# Patient Record
(demographics unavailable — no encounter records)

---

## 2024-11-01 NOTE — PLAN
[FreeTextEntry1] : #Well person exam -Medical/surgical/family history reviewed -Allergies and medications reconciled -Pap performed -Reviewed breast awareness/calcium/vitamin D/weight bearing exercise   #Health Care Maintenance -Mammogram + breast sono rx given- due 6/2025 -Colonoscopy due, pt states she has to schedule, previously did cologuard this year which was negative -Dexa to be scheduled with Dr. Chaudhry  #vaginal dryness -discussed lubricants -gave pamphlets for revaree & hyaloyn   All questions/concerns of pt addressed to their satisfaction.   F/u in 1 yr or sooner PRN.

## 2024-11-01 NOTE — HISTORY OF PRESENT ILLNESS
[FreeTextEntry1] : Pt is a 68 year  postmenopausal female presenting today for annual exam.   Pt without any concerns at this time. Experiences intermittent vaginal dryness but it is not bothersome to her at this time. Pt w/ h/o osteoporosis--follows w/ Dr. Chaudhry; was previously on medication but did not tolerate it. Is seeing him next week for follow up appt. Denies breast pain, abnormal nipple discharge, abdominal pain, abnormal uterine bleeding, vaginal discharge/irritation, dysuria, dyspareunia. Pt is not sexually active.  Pt reports regular exercise. Pt reports balanced diet.  PHQ9: 0   OB: NSVDx2  Gyn: denies abnormal pap smears, fibroids, endometriosis, ovarian cysts, STIs PMH: osteoporosis, graves disease--now hypothyroid PSH: denies FMHx: denies family h/o breast, ovarian, uterine, or colon cancer Meds: synthroid 50mcg Allx: NKDA Social: denies alcohol use, denies tobacco use, denies drug use [Mammogramdate] : 06/24 [BreastSonogramDate] : 06/24 [PapSmeardate] : 2022 [BoneDensityDate] : 2023 [ColonoscopyDate] : 2017

## 2024-11-01 NOTE — PHYSICAL EXAM
[Appropriately responsive] : appropriately responsive [Alert] : alert [No Acute Distress] : no acute distress [Soft] : soft [Non-tender] : non-tender [Non-distended] : non-distended [No Lesions] : no lesions [No Mass] : no mass [Oriented x3] : oriented x3 [Examination Of The Breasts] : a normal appearance [Diffuse Fibrous Tissue In The Left Breast] : fibrocystic changes [No Masses] : no breast masses were palpable [Labia Majora] : normal [Atrophy] : atrophy [Normal] : normal [Tenderness] : nontender [Uterine Adnexae] : normal [No Tenderness] : no tenderness [Nl Sphincter Tone] : normal sphincter tone [FreeTextEntry9] : guaiac negative

## 2024-11-05 NOTE — ASSESSMENT
[FreeTextEntry1] : 66-year-old woman with history of hypothyroidism status post PRADHAN for Graves' disease, osteoporosis, prediabetes, here for endocrinology follow-up. Last seen Nov 2021.  1. Hypothyroidism Check TFT today Clinically and biochemically euthyroid Adjust dosage of levothyroxine as needed.  2. Prediabetes. A1c 5.6% July 2024 A1c 5.9% June 2023 Exercise: Discussed the regular exercises are beneficial in type 2 diabetes, independent of weight loss. Encouraged to decrease sedentary time and perform 30 to 60 minutes of moderate intensity aerobic exercise on most days of the week, at least 150 minutes of moderate intensity aerobic exercise per week. Recommend Carb. consistent diet Check A1c level today.  3. Osteoporosis Was treated with alendronate since 2019.  Discontinued in 2022. Bone density as noted below. L1-L4 11/5/2024: Age 68: BMD 0.763, T-score -2.6, -3.2%*, -1.6% 11/14/2023: Age 67: BMD 0.775, T-score -2.5, -1.6%, -1.3% 10/31/2022: Age 66: BMD 0.75, T-score -2.4, -0.2%, -3.7%* 12/4/2020: Age 64: BMD 0.815, T-score -2.1, 3.5%*, 3.5%* 12/6/2019: Age 63: BMD 0.78, T-score -2.4  Total hip 11/5/2024: BMD 0.643, T-score -2.5, -6.7%*, -6.0%* 11/14/2023: BMD 0.684, T-score -2.1, -0.7%, -0.1% 10/31/2022: BMD 0.685, T-score -2.1, -0.6%, 0.7% 12/4/2020: BMD 0.680, T-score -2.1, -1.3%, -1.2% 12/6/2019: BMD 0.69, T-score -2.1  Femoral neck 11/5/2024: BMD 0.541, T-score -2.8, -5.9%*, -4.9% 11/14/2023: BMD 0.570, T-score -2.5, -1.0%, -3.3% 10/31/2022: BMD 0.589, T-score -2.3, 2.4%, -0.7% 12/4/2020: BMD 0.593, T-score -2.3, 3.1%, 3.1% 12/6/2019: BMD 0.575, T-score -2.5  We discussed the acute changes in bone density over the last one year I strongly recommend restarting oral bisphosphonates.  Patient elected to repeat bone density in one year and decide.  Does not want to consider any injectable therapy including Prolia or Reclast or Evenity.  She has good balance, we discussed the importance of fall prevention.  Discussed the potential use of raloxifene for treatment of osteoporosis with its specific benefits in the vertebral spine.  She will consider more literature reading and get back to me regarding her decision.

## 2024-11-05 NOTE — RESULTS/DATA
[FreeTextEntry1] : L1-L4 11/5/2024: Age 68: BMD 0.763, T-score -2.6, -3.2%*, -1.6% 11/14/2023: Age 67: BMD 0.775, T-score -2.5, -1.6%, -1.3% 10/31/2022: Age 66: BMD 0.75, T-score -2.4, -0.2%, -3.7%* 12/4/2020: Age 64: BMD 0.815, T-score -2.1, 3.5%*, 3.5%* 12/6/2019: Age 63: BMD 0.78, T-score -2.4  Total hip 11/5/2024: BMD 0.643, T-score -2.5, -6.7%*, -6.0%* 11/14/2023: BMD 0.684, T-score -2.1, -0.7%, -0.1% 10/31/2022: BMD 0.685, T-score -2.1, -0.6%, 0.7% 12/4/2020: BMD 0.680, T-score -2.1, -1.3%, -1.2% 12/6/2019: BMD 0.69, T-score -2.1  Femoral neck 11/5/2024: BMD 0.541, T-score -2.8, -5.9%*, -4.9% 11/14/2023: BMD 0.570, T-score -2.5, -1.0%, -3.3% 10/31/2022: BMD 0.589, T-score -2.3, 2.4%, -0.7% 12/4/2020: BMD 0.593, T-score -2.3, 3.1%, 3.1% 12/6/2019: BMD 0.575, T-score -2.5

## 2024-11-05 NOTE — HISTORY OF PRESENT ILLNESS
[FreeTextEntry1] : 68 Woman with history of Graves' disease status post radioactive iodine treatment, prediabetes, osteoporosis, here for endocrinology follow-up.  Former endocrinologist Dr. Racheal Pryor.  Regarding hypothyroidism, she had a history of Graves' disease, treated with PRADHAN in 2010, since then has been on Synthroid therapy.  Currently taking Synthroid 50 mcg p.o. daily in the morning for the last 12 years.    Last thyroid ultrasound done in 2016 within normal limits.  No focal nodule was seen.  Regarding osteoporosis, she had a bone mineral density done in December 2019. L-spine T score -3.4, Left neck T score -2.5, left hip T score -2.1 33% radius -2.8% Patient was started on Fosamax and December 2019.  She takes the medication every week.  She reports no side effecdts.  But since one year ago, she went on a drug Holiday given that she was approaching 5 years of treatment and she is reporting some side effects with joint aches.  Patient takes calcium 500 mg once daily plus vitamin D as well as a multivitamin.  She gets calcium in her diet.  She eats a lot of cheese.  Does some weightlifting daily.  Eats a well-balanced diet   Last ultrasound in February 2023 and they were found within normal limits.  No complaint of neck enlargement this visit.

## 2025-07-21 NOTE — PHYSICAL EXAM

## 2025-07-21 NOTE — HISTORY OF PRESENT ILLNESS
[FreeTextEntry1] : Here for routine follow up. Palpitations have been few and far between. No new complaints. Good exertional capacity. Echo today.

## 2025-07-21 NOTE — CARDIOLOGY SUMMARY
[de-identified] : Poor baseline, SR, WNL
[de-identified] : Poor baseline, SR, WNL
Normal rate, regular rhythm.  Heart sounds S1, S2.  No murmurs, rubs or gallops.

## 2025-07-21 NOTE — PHYSICAL EXAM

## 2025-07-21 NOTE — DISCUSSION/SUMMARY
[FreeTextEntry1] : Reassurance Advised follow up 2 years unless symptoms worsen then to return sooner. Review echo.  [EKG obtained to assist in diagnosis and management of assessed problem(s)] : EKG obtained to assist in diagnosis and management of assessed problem(s)